# Patient Record
Sex: MALE | Race: WHITE | NOT HISPANIC OR LATINO | URBAN - METROPOLITAN AREA
[De-identification: names, ages, dates, MRNs, and addresses within clinical notes are randomized per-mention and may not be internally consistent; named-entity substitution may affect disease eponyms.]

---

## 2018-02-22 ENCOUNTER — OFFICE VISIT - RIVER FALLS (OUTPATIENT)
Dept: FAMILY MEDICINE | Facility: CLINIC | Age: 33
End: 2018-02-22

## 2022-02-12 VITALS — HEART RATE: 78 BPM | TEMPERATURE: 97.8 F | SYSTOLIC BLOOD PRESSURE: 141 MMHG | DIASTOLIC BLOOD PRESSURE: 78 MMHG

## 2022-02-15 NOTE — PROGRESS NOTES
Patient:   NANCY YANG            MRN: [id number]            FIN: [id number]               Age:   32 years     Sex:  Male     :  1985   Associated Diagnoses:   Laceration of left index finger   Author:   Maya Brand      Physical Examination   General:  Vital Signs   2018 4:14 PM CST Temperature Tympanic 97.8 DegF  LOW    Peripheral Pulse Rate 78 bpm    Pulse Site Brachial artery    HR Method Electronic    Systolic Blood Pressure 141 mmHg  HI    Diastolic Blood Pressure 78 mmHg    Mean Arterial Pressure 99 mmHg    BP Site Right arm    BP Method Electronic      , Lungs are clear to auscultation.    Integumentary:  Warm, Dry, No pallor, No rash.       Procedure   Laceration repair procedure   Date/ Time:  2018 4:57:00 PM.     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     Confirmed: patient, procedure, side, site, safety procedures followed.     Performed by: self.     Informed consent: verbal consent, counseled on the r, risks and benefits of the laceration repair, would like to proceed with repair.     Preparation and technique: anesthesia digital block, skin prepped in usual sterile fashion, sterile preparation of site (with 2% chlorhexidine gluconate, draped to expose affected area, finger soaked), wound explored explored, no evidence of foreign body, skin closure completed (with sutures, 4-0 Ethilon P3 needle, 5 interrupted sutures placed), petroleum jelley applied to woundantibiotic ointment applied, drysterile gauzewith splint and tube gauze to give significant protection in his workdressing applied.     Procedure tolerated: well.        Impression and Plan   Diagnosis     Laceration of left index finger ([id number] S61.211A).     Orders     keep area clean with soap and water gently wash, use antibiotic ointment frequently to  area, cover laceration if exposed to dust.     suture remoavl 10 days.